# Patient Record
Sex: MALE | Race: WHITE | NOT HISPANIC OR LATINO | Employment: UNEMPLOYED | URBAN - METROPOLITAN AREA
[De-identification: names, ages, dates, MRNs, and addresses within clinical notes are randomized per-mention and may not be internally consistent; named-entity substitution may affect disease eponyms.]

---
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
## 2021-02-03 ENCOUNTER — TRANSFERRED RECORDS (OUTPATIENT)
Dept: HEALTH INFORMATION MANAGEMENT | Facility: CLINIC | Age: 21
End: 2021-02-03

## 2021-05-17 ENCOUNTER — TRANSFERRED RECORDS (OUTPATIENT)
Dept: HEALTH INFORMATION MANAGEMENT | Facility: CLINIC | Age: 21
End: 2021-05-17

## 2021-07-02 ENCOUNTER — TRANSFERRED RECORDS (OUTPATIENT)
Dept: HEALTH INFORMATION MANAGEMENT | Facility: CLINIC | Age: 21
End: 2021-07-02

## 2021-07-02 LAB
CREATININE (EXTERNAL): 0.75 MG/DL (ref 0.72–1.25)
GLUCOSE (EXTERNAL): 83 MG/DL (ref 70–99)
POTASSIUM (EXTERNAL): 3.7 MMOL/L (ref 3.3–4.8)

## 2021-09-15 ENCOUNTER — TRANSFERRED RECORDS (OUTPATIENT)
Dept: HEALTH INFORMATION MANAGEMENT | Facility: CLINIC | Age: 21
End: 2021-09-15

## 2022-07-18 ENCOUNTER — TELEPHONE (OUTPATIENT)
Dept: CARDIOLOGY | Facility: CLINIC | Age: 22
End: 2022-07-18

## 2022-07-18 ENCOUNTER — OFFICE VISIT (OUTPATIENT)
Dept: FAMILY MEDICINE | Facility: CLINIC | Age: 22
End: 2022-07-18
Payer: MEDICAID

## 2022-07-18 VITALS
BODY MASS INDEX: 19.4 KG/M2 | DIASTOLIC BLOOD PRESSURE: 71 MMHG | WEIGHT: 120.7 LBS | HEIGHT: 66 IN | HEART RATE: 96 BPM | TEMPERATURE: 98.1 F | SYSTOLIC BLOOD PRESSURE: 134 MMHG

## 2022-07-18 DIAGNOSIS — Z87.74 ADULT PATIENT WITH HISTORY OF CONGENITAL HEART DISEASE: Primary | ICD-10-CM

## 2022-07-18 DIAGNOSIS — R51.9 CHRONIC NONINTRACTABLE HEADACHE, UNSPECIFIED HEADACHE TYPE: ICD-10-CM

## 2022-07-18 DIAGNOSIS — G89.29 CHRONIC NONINTRACTABLE HEADACHE, UNSPECIFIED HEADACHE TYPE: ICD-10-CM

## 2022-07-18 PROCEDURE — 99204 OFFICE O/P NEW MOD 45 MIN: CPT | Performed by: FAMILY MEDICINE

## 2022-07-18 RX ORDER — KETOROLAC TROMETHAMINE 10 MG/1
10 TABLET, FILM COATED ORAL EVERY 6 HOURS PRN
COMMUNITY
End: 2022-07-18

## 2022-07-18 RX ORDER — ASPIRIN 81 MG/1
81 TABLET, CHEWABLE ORAL DAILY
COMMUNITY

## 2022-07-18 RX ORDER — KETOROLAC TROMETHAMINE 10 MG/1
10 TABLET, FILM COATED ORAL EVERY 6 HOURS PRN
Qty: 30 TABLET | Refills: 3 | Status: SHIPPED | OUTPATIENT
Start: 2022-07-18 | End: 2023-12-07

## 2022-07-18 NOTE — TELEPHONE ENCOUNTER
New Congenital/CV Genetics Patient Intake    Referred by: Dr. Ding  1.  Patient's cardiac history:  when pt was born, they said there was some type of valve that tightened up so blood wasn't getting to where it was needed so he had surgeries at 10 days old. Back in oct 2018 pt had second heart surgery  2.  Previous cardiac procedures (heart catherizations, surgeries): 2 open heart surgeries, 1st one was in [facility], and the other ones in [facility].  3.  Patient's previous cardiologist and when last visit was: September of last year, [facility],   4.  Recent testing (Echo, MRI, CT, Stress tests): Last September  5.  Any present concerns: None  6.  Closet location for patient to be seen ([facility]): [facility]  7.  Any recent testing at the [facility]: none  8.  Patient's E mail or Fax number to send ALCIDES: [email or web address]  9.  Update chart with patient's PCP: None  10. Any genetic testing done within the family: None  11. Reason for referral: Establish care     RECORDS REQUESTED FROM:         Clinic name Comments Records Status Imaging Status                                                          RECORDS STATUS: In process

## 2022-07-18 NOTE — TELEPHONE ENCOUNTER
[facility] Call Center    Phone Message    May a detailed message be left on voicemail: yes     Reason for Call: Appointment Intake    Referring Provider Name: Julio Ding MD    Diagnosis and/or Symptoms: Congenital Heart Disease

## 2022-07-18 NOTE — PROGRESS NOTES
Assessment & Plan     Adult patient with history of congenital heart disease  Referral has been placed for follow-up of his congenital heart disease.  Currently has no significant activity restrictions.  SBE prophylaxis advised prior to dental appointments.  - Adult Cardiology Micaelaal Duane Referral; Future    Chronic nonintractable headache, unspecified headache type  Satisfied with current control, continue with ketorolac as abortive therapy  - ketorolac (TORADOL) 10 MG tablet; Take 1 tablet (10 mg) by mouth every 6 hours as needed for moderate pain (headaches)                 No follow-ups on file.    Julio Ding MD  [facility]    Rosalee Burgos is a 22 year old accompanied by his father, presenting for the following health issues:  Establish Care (Establish [facility], recently moved from [place].  Would like to get set up w/ cardiologist)      History of Present Illness       Reason for visit:  Establish care    He eats 0-1 servings of fruits and vegetables daily.He consumes 1 sweetened beverage(s) daily.He exercises with enough effort to increase his heart rate 30 to 60 minutes per day.  He exercises with enough effort to increase his heart rate 3 or less days per week.   He is taking medications regularly.    Patient is here to establish [facility], recently moved to area from [place].  He would like to get set up with a cardiologist, has had 2 open heart surgeries in the past.     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  He had surgery as an infant and follow-up surgery as a teenager.  He is currently doing well.  He and his family recently moved from [facility].  His father is a [employer]  at the local [employer].  His last visit with cardiology was in September of last year.  He is due for follow-up in August or September of this year.  He would like to establish cardiology care locally.  He also  "has intermittent headaches.  He describes them as throbbing and left-sided.  He does not recall having a diagnosis of migraine.  He treats the headache with ketorolac abortively.  He reports about 3 headaches per month.  He is currently satisfied with headache control.    Records from [facility] have been reviewed.        Review of Systems   Constitutional, HEENT, cardiovascular, pulmonary, gi and gu systems are negative, except as otherwise noted.      Objective    /71 (BP Location: Left arm, Cuff Size: Adult Regular)   Pulse 96   Temp 98.1  F (36.7  C) (Tympanic)   Ht 1.664 m (5' 5.5\")   Wt 54.7 kg (120 lb 11.2 oz)   BMI 19.78 kg/m    Body mass index is 19.78 kg/m .  Physical Exam   GENERAL: healthy, alert and no distress  NECK: no adenopathy, no asymmetry, masses, or scars and thyroid normal to palpation  RESP: lungs clear to auscultation - no rales, rhonchi or wheezes  CV: regular rate and rhythm, normal S1 S2, no S3 or S4, no murmur, click or rub, no peripheral edema and peripheral pulses strong  ABDOMEN: soft, nontender, no hepatosplenomegaly, no masses and bowel sounds normal  MS: no gross musculoskeletal defects noted, no edema  PSYCH: mentation appears normal, affect normal/bright                    .  ..  "

## 2022-07-25 ENCOUNTER — CARE COORDINATION (OUTPATIENT)
Dept: CARDIOLOGY | Facility: CLINIC | Age: 22
End: 2022-07-25

## 2022-07-25 DIAGNOSIS — Q24.9 ADULT CONGENITAL HEART DISEASE: ICD-10-CM

## 2022-07-25 DIAGNOSIS — Q20.0 TRUNCUS ARTERIOSUS: Primary | ICD-10-CM

## 2022-07-25 NOTE — PROGRESS NOTES
Date: 7/25/2022    Time of Call: 5:18 PM     Diagnosis:  Truncus arteriousus     [ TORB ] Ordering provider: Dr Micah Varner  Order: establish care with [facility] clinic with echo and labs prior     Order received by: Nerissa Platt    Follow-up/additional notes:   Pt to establish care from [facility]- last seen 9/2021 by Dr Vazquez    Aubrey was last seen while in the hospital. 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 He was admitted last month for pulmonary valve homograft placement (25 mm), RPA stent removal with RPA reconstruction. He was discharged on 10/18/18 and represented with a fever and large left-sided pleural effusion. He was last seen in January 2020. He then underwent cardiac cath with stent placement in the coarctation.    Aubrey was seen in the Pediatric Cardiology Clinic on 09/15/21. According to his father, who accompanied him to the visit, he has been doing well with no further chest pain. He has no other symptoms related to his heart.      In summary, Aubrey is followed after RV-PA conduit replacement and PA augmentation. He is doing well at this time.     Risk: There is a risk of SBE, which may be life threatening.  Cardiac catheterization or surgery may be needed.      Patient counseling: Time was spent providing education regarding the patient's specific cardiovascular condition, today's assessment, and the long term outlook. The family verbalizes understanding of the plan and condition. All current questions were answered.     SBE prophylaxis: According to the 2007 AHA guidelines, antibiotic prophylaxis is recommended for dental work or other procedures. Ongoing routine dental care and good oral hygiene is recommended to lower the risk of SBE, as well.      Plan:  Medications/Treatments:   Continue ASA 81mg po q day    Follow-up testing: Echo, ECG    Return to Clinic: 12  months

## 2022-09-19 ENCOUNTER — DOCUMENTATION ONLY (OUTPATIENT)
Dept: CARDIOLOGY | Facility: CLINIC | Age: 22
End: 2022-09-19

## 2022-11-03 ENCOUNTER — HOSPITAL ENCOUNTER (OUTPATIENT)
Dept: CARDIOLOGY | Facility: CLINIC | Age: 22
Discharge: HOME | End: 2022-11-03
Payer: MEDICAID

## 2022-11-03 ENCOUNTER — OFFICE VISIT (OUTPATIENT)
Dept: PEDIATRIC CARDIOLOGY | Facility: CLINIC | Age: 22
End: 2022-11-03
Attending: FAMILY MEDICINE
Payer: MEDICAID

## 2022-11-03 ENCOUNTER — LAB (OUTPATIENT)
Dept: LAB | Facility: CLINIC | Age: 22
End: 2022-11-03
Payer: MEDICAID

## 2022-11-03 VITALS
HEIGHT: 65 IN | TEMPERATURE: 98.3 F | WEIGHT: 113.7 LBS | HEART RATE: 76 BPM | SYSTOLIC BLOOD PRESSURE: 154 MMHG | DIASTOLIC BLOOD PRESSURE: 86 MMHG | BODY MASS INDEX: 18.94 KG/M2 | RESPIRATION RATE: 16 BRPM | OXYGEN SATURATION: 100 %

## 2022-11-03 DIAGNOSIS — Q20.0 TRUNCUS ARTERIOSUS: ICD-10-CM

## 2022-11-03 DIAGNOSIS — Q24.9 ADULT CONGENITAL HEART DISEASE: ICD-10-CM

## 2022-11-03 DIAGNOSIS — Z87.74 ADULT PATIENT WITH HISTORY OF CONGENITAL HEART DISEASE: ICD-10-CM

## 2022-11-03 LAB
ALBUMIN SERPL BCG-MCNC: 4.5 G/DL (ref 3.5–5.2)
ALP SERPL-CCNC: 44 U/L (ref 40–129)
ALT SERPL W P-5'-P-CCNC: 22 U/L (ref 10–50)
ANION GAP SERPL CALCULATED.3IONS-SCNC: 9 MMOL/L (ref 7–15)
AST SERPL W P-5'-P-CCNC: 23 U/L (ref 10–50)
BASOPHILS # BLD AUTO: 0 10E3/UL (ref 0–0.2)
BASOPHILS NFR BLD AUTO: 1 %
BILIRUB SERPL-MCNC: 0.6 MG/DL
BUN SERPL-MCNC: 13.8 MG/DL (ref 6–20)
CALCIUM SERPL-MCNC: 9.4 MG/DL (ref 8.6–10)
CHLORIDE SERPL-SCNC: 103 MMOL/L (ref 98–107)
CHOLEST SERPL-MCNC: 145 MG/DL
CREAT SERPL-MCNC: 0.89 MG/DL (ref 0.67–1.17)
DEPRECATED HCO3 PLAS-SCNC: 27 MMOL/L (ref 22–29)
EOSINOPHIL # BLD AUTO: 0.1 10E3/UL (ref 0–0.7)
EOSINOPHIL NFR BLD AUTO: 3 %
ERYTHROCYTE [DISTWIDTH] IN BLOOD BY AUTOMATED COUNT: 12.5 % (ref 10–15)
GFR SERPL CREATININE-BSD FRML MDRD: >90 ML/MIN/1.73M2
GLUCOSE SERPL-MCNC: 97 MG/DL (ref 70–99)
HCT VFR BLD AUTO: 45.7 % (ref 40–53)
HDLC SERPL-MCNC: 67 MG/DL
HGB BLD-MCNC: 14.4 G/DL (ref 13.3–17.7)
IMM GRANULOCYTES # BLD: 0 10E3/UL
IMM GRANULOCYTES NFR BLD: 0 %
LDLC SERPL CALC-MCNC: 70 MG/DL
LYMPHOCYTES # BLD AUTO: 1.6 10E3/UL (ref 0.8–5.3)
LYMPHOCYTES NFR BLD AUTO: 36 %
MCH RBC QN AUTO: 28.6 PG (ref 26.5–33)
MCHC RBC AUTO-ENTMCNC: 31.5 G/DL (ref 31.5–36.5)
MCV RBC AUTO: 91 FL (ref 78–100)
MONOCYTES # BLD AUTO: 0.5 10E3/UL (ref 0–1.3)
MONOCYTES NFR BLD AUTO: 11 %
NEUTROPHILS # BLD AUTO: 2.2 10E3/UL (ref 1.6–8.3)
NEUTROPHILS NFR BLD AUTO: 49 %
NONHDLC SERPL-MCNC: 78 MG/DL
NRBC # BLD AUTO: 0 10E3/UL
NRBC BLD AUTO-RTO: 0 /100
PLATELET # BLD AUTO: 196 10E3/UL (ref 150–450)
POTASSIUM SERPL-SCNC: 4 MMOL/L (ref 3.4–5.3)
PROT SERPL-MCNC: 7.4 G/DL (ref 6.4–8.3)
RBC # BLD AUTO: 5.04 10E6/UL (ref 4.4–5.9)
SODIUM SERPL-SCNC: 139 MMOL/L (ref 136–145)
TRIGL SERPL-MCNC: 40 MG/DL
TSH SERPL DL<=0.005 MIU/L-ACNC: 2.69 UIU/ML (ref 0.3–4.2)
WBC # BLD AUTO: 4.5 10E3/UL (ref 4–11)

## 2022-11-03 PROCEDURE — 99205 OFFICE O/P NEW HI 60 MIN: CPT | Mod: 25

## 2022-11-03 PROCEDURE — G0463 HOSPITAL OUTPT CLINIC VISIT: HCPCS

## 2022-11-03 PROCEDURE — 93010 ELECTROCARDIOGRAM REPORT: CPT

## 2022-11-03 PROCEDURE — 93325 DOPPLER ECHO COLOR FLOW MAPG: CPT | Mod: 26 | Performed by: PEDIATRICS

## 2022-11-03 PROCEDURE — 84155 ASSAY OF PROTEIN SERUM: CPT

## 2022-11-03 PROCEDURE — 93303 ECHO TRANSTHORACIC: CPT | Mod: 26 | Performed by: PEDIATRICS

## 2022-11-03 PROCEDURE — 85025 COMPLETE CBC W/AUTO DIFF WBC: CPT

## 2022-11-03 PROCEDURE — 84443 ASSAY THYROID STIM HORMONE: CPT

## 2022-11-03 PROCEDURE — 36415 COLL VENOUS BLD VENIPUNCTURE: CPT

## 2022-11-03 PROCEDURE — 93320 DOPPLER ECHO COMPLETE: CPT | Mod: 26 | Performed by: PEDIATRICS

## 2022-11-03 PROCEDURE — 80061 LIPID PANEL: CPT

## 2022-11-03 PROCEDURE — 93325 DOPPLER ECHO COLOR FLOW MAPG: CPT

## 2022-11-03 RX ORDER — AMOXICILLIN 500 MG/1
CAPSULE ORAL
Qty: 20 CAPSULE | Refills: 3 | Status: SHIPPED | OUTPATIENT
Start: 2022-11-03 | End: 2023-12-07

## 2022-11-03 ASSESSMENT — PAIN SCALES - GENERAL: PAINLEVEL: NO PAIN (0)

## 2022-11-03 NOTE — NURSING NOTE
Cardiac Monitors: Patient was instructed regarding the indication, function, care and prompt return of a holter  monitor. The monitor was placed on the patient with instructions regarding care of the skin electrodes and monitor, as well as documentation in the patient diary. Patient demonstrated understanding of this information and agreed to call with further questions or concerns.    Cardiac Testing: Patient given instructions regarding  echocardiogram . Discussed purpose, preparation, procedure and when to expect results reported back to the patient. Patient demonstrated understanding of this information and agreed to call with further questions or concerns.    Med Reconcile: Reviewed and verified all current medications with the patient. The updated medication list was printed and given to the patient.    Return Appointment: Patient given instructions regarding scheduling next clinic visit. Patient demonstrated understanding of this information and agreed to call with further questions or concerns.    Patient stated he understood all health information given and agreed to call with further questions or concerns.
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
## 2022-11-03 NOTE — PATIENT INSTRUCTIONS
You were seen today in the Adult Congenital and Cardiovascular Genetics Clinic at the [facility].    Cardiology Providers you saw during your visit:  Micah Varner MD    Diagnosis:  Truncus arteriosus    Results:  Micah Varner MD reviewed the results of your EKG , echo and lab testing today in clinic.    Your recommendations from today's visit:    We will send out a 1 week zio to place when convenient  We will call you to schedule a head MRA  We have sent an amoxacillin prescription to use before the dentist    General cardiology recommendations:    Continue to eat a heart healthy, low salt diet.  Continue to get 20-30 minutes of aerobic activity, 4-5 days per week.  Examples of aerobic activity include walking, running, swimming, cycling, etc.  Continue to observe good oral hygiene, with regular dental visits.    SBE prophylaxis:   Yes__x__  No____    Lifelong Bacterial Endocarditis Prophylaxis:  YES____  NO____    If YES is checked, follow the recommendations outlined below:  Take antibiotic(s) prior to recommended dental procedures and procedures on the respiratory tract or with infected skin, muscle or bones. SBE prophylaxis is not needed for routine GI and  procedures (ie. Colonoscopy or vaginal delivery)  Observe good oral hygiene daily, as advised by your dentist. Get regular professional dental care.  Keep cuts clean.  Infections should be treated promptly.  Symptoms of Infective Endocarditis could include: fever lasting more than 4-5 days or a recurrent fever that initially resolves but returns within 1-2 days)      Exercise restrictions:   Yes__X__  No____         If yes, list restrictions:  Must be allowed to rest if fatigued or SOB      Work restrictions:  Yes____  No_X___         If yes, list restrictions:    FASTING CHOLESTEROL was checked in the last 5 years YES__x_  NO___ (2022)    Follow-up:  Follow up with Dr Varner in 1 year with echo and ekg prior    If you have questions or concerns  please contact us at:    HARRY GravesN, RN    Netta Harris (Scheduling)  Nurse Care Coordinator     Clinic   Adult Congenital and CV Genetics   Adult Congenital and CV Genetic  [facility]  (P) [phone]     (P) [phone]         (F) [phone]        For after hours urgent needs, call [phone] and ask to speak to the Adult Congenital Physician on call.  Mention Job Code 0401.    For emergencies call 911.    [facility]   Clinics and Surgery Center  Mail Code [id number]  [address] [place] [address]

## 2023-01-17 ENCOUNTER — ANCILLARY PROCEDURE (OUTPATIENT)
Dept: CARDIOLOGY | Facility: CLINIC | Age: 23
End: 2023-01-17
Payer: MEDICAID

## 2023-01-17 ENCOUNTER — ANCILLARY PROCEDURE (OUTPATIENT)
Dept: MRI IMAGING | Facility: CLINIC | Age: 23
End: 2023-01-17
Payer: MEDICAID

## 2023-01-17 DIAGNOSIS — Q20.0 TRUNCUS ARTERIOSUS: ICD-10-CM

## 2023-01-17 DIAGNOSIS — Z87.74 ADULT PATIENT WITH HISTORY OF CONGENITAL HEART DISEASE: ICD-10-CM

## 2023-01-17 DIAGNOSIS — Q24.9 ADULT CONGENITAL HEART DISEASE: ICD-10-CM

## 2023-01-17 PROCEDURE — 93244 EXT ECG>48HR<7D REV&INTERPJ: CPT

## 2023-01-17 PROCEDURE — 70544 MR ANGIOGRAPHY HEAD W/O DYE: CPT | Mod: GC | Performed by: RADIOLOGY

## 2023-01-17 PROCEDURE — 93242 EXT ECG>48HR<7D RECORDING: CPT

## 2023-01-17 NOTE — PROGRESS NOTES
Per Dr. Varner, patient to have Zio monitor placed.  Diagnosis: Truncus Arteriosus  Monitor placed: Yes  Patient Instructed: Yes  Patient verbalized understanding: Yes  Holter # [id number]

## 2023-11-09 ENCOUNTER — HOSPITAL ENCOUNTER (OUTPATIENT)
Dept: CARDIOLOGY | Facility: CLINIC | Age: 23
Discharge: HOME | End: 2023-11-09
Payer: MEDICAID

## 2023-11-09 DIAGNOSIS — Q20.0 TRUNCUS ARTERIOSUS: ICD-10-CM

## 2023-11-09 DIAGNOSIS — Z87.74 ADULT PATIENT WITH HISTORY OF CONGENITAL HEART DISEASE: ICD-10-CM

## 2023-11-09 DIAGNOSIS — Q24.9 ADULT CONGENITAL HEART DISEASE: ICD-10-CM

## 2023-11-09 PROCEDURE — 93320 DOPPLER ECHO COMPLETE: CPT | Mod: 26 | Performed by: PEDIATRICS

## 2023-11-09 PROCEDURE — 93325 DOPPLER ECHO COLOR FLOW MAPG: CPT

## 2023-11-09 PROCEDURE — 93303 ECHO TRANSTHORACIC: CPT | Mod: 26 | Performed by: PEDIATRICS

## 2023-11-09 PROCEDURE — 93303 ECHO TRANSTHORACIC: CPT

## 2023-11-09 PROCEDURE — 93325 DOPPLER ECHO COLOR FLOW MAPG: CPT | Mod: 26 | Performed by: PEDIATRICS

## 2023-12-07 ENCOUNTER — OFFICE VISIT (OUTPATIENT)
Dept: PEDIATRIC CARDIOLOGY | Facility: CLINIC | Age: 23
End: 2023-12-07
Payer: MEDICAID

## 2023-12-07 VITALS
HEIGHT: 65 IN | SYSTOLIC BLOOD PRESSURE: 131 MMHG | OXYGEN SATURATION: 100 % | DIASTOLIC BLOOD PRESSURE: 80 MMHG | HEART RATE: 82 BPM | WEIGHT: 113.98 LBS | BODY MASS INDEX: 18.99 KG/M2

## 2023-12-07 DIAGNOSIS — Z87.74 ADULT PATIENT WITH HISTORY OF CONGENITAL HEART DISEASE: ICD-10-CM

## 2023-12-07 DIAGNOSIS — Q24.9 ADULT CONGENITAL HEART DISEASE: ICD-10-CM

## 2023-12-07 DIAGNOSIS — Q20.0 TRUNCUS ARTERIOSUS: ICD-10-CM

## 2023-12-07 PROCEDURE — 99214 OFFICE O/P EST MOD 30 MIN: CPT

## 2023-12-07 PROCEDURE — G0463 HOSPITAL OUTPT CLINIC VISIT: HCPCS

## 2023-12-07 PROCEDURE — 93010 ELECTROCARDIOGRAM REPORT: CPT

## 2023-12-07 PROCEDURE — 93005 ELECTROCARDIOGRAM TRACING: CPT

## 2023-12-07 RX ORDER — AMOXICILLIN 500 MG/1
CAPSULE ORAL
Qty: 20 CAPSULE | Refills: 3 | Status: SHIPPED | OUTPATIENT
Start: 2023-12-07

## 2023-12-07 ASSESSMENT — PATIENT HEALTH QUESTIONNAIRE - PHQ9: SUM OF ALL RESPONSES TO PHQ QUESTIONS 1-9: 7
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
## 2023-12-07 NOTE — NURSING NOTE
"Informant-    Aubrey is accompanied by father    Reason for Visit-  Truncus aterious     Vitals signs-  /80   Pulse 82   Ht 1.656 m (5' 5.2\")   Wt 51.7 kg (113 lb 15.7 oz)   SpO2 100%   BMI 18.85 kg/m      There are concerns about the child's exposure to violence in the home: No    Need Flu Shot: No    Need MyChart: No    Does the patient need any medication refills today? No    Face to Face time: 5 Minutes  Ruthie Clay MA      "

## 2023-12-07 NOTE — PATIENT INSTRUCTIONS
You were seen today in the Adult Congenital and Cardiovascular Genetics Clinic at the [facility].    Cardiology Providers you saw during your visit:  YURI Villalba MD and Micah Varner MD    Diagnosis:  Truncus arteriosus    Results:  Micah Varner MD reviewed the results of your EKG and echo testing today in clinic.    Recommendations for you:    Take your amoxicillin prior to dental appointments      General Cardiac Recommendations:  Continue to eat a heart healthy, low salt diet.  Continue to get 20-30 minutes of aerobic activity, 4-5 days per week.  Examples of aerobic activity include walking, running, swimming, cycling, etc.  Continue to observe good oral hygiene, with regular dental visits.      SBE prophylaxis:   Yes__X__  No____    If YES is checked, follow the recommendations outlined below:  Take antibiotic(s) prior to recommended dental procedures and procedures on the respiratory tract or with infected skin, muscle or bones. SBE prophylaxis is not needed for routine GI and  procedures (ie. Colonoscopy or vaginal delivery)  Observe good oral hygiene daily, as advised by your dentist. Get regular professional dental care.  Keep cuts clean.  Infections should be treated promptly.  Symptoms of Infective Endocarditis could include: fever lasting more than 4-5 days or a recurrent fever that initially resolves but returns within 1-2 days)      Exercise restrictions:   Yes__X__  No____         If yes, list restrictions:  Must be allowed to rest if fatigued or SOB      FASTING CHOLESTEROL was checked in the last 5 years YES_X__  NO___ (2022)      Follow-up:  Follow up with Dr Varner in 1 year with an echo and EKG     If you have questions or concerns please contact us at:    Nerissa Platt, RN, BSN   Netta Harris (Scheduling)  Nurse Care Coordinator     Clinic   Adult Congenital and CV Genetics   Adult Congenital and CV Genetic  [facility]   [facility]  [place] [facility]  (P) [phone]     (P) [phone]            (F) [phone]        For after hours urgent needs, call [phone] and ask to speak to the Adult Congenital Physician on call.  Mention Job Code 0401.    For emergencies call 911.    [facility]  [facility]  Mail Code [id number]  [address] [place] [id number]

## 2025-02-05 ENCOUNTER — ANCILLARY PROCEDURE (OUTPATIENT)
Dept: CARDIOLOGY | Facility: CLINIC | Age: 25
End: 2025-02-05
Payer: MEDICAID

## 2025-02-05 DIAGNOSIS — Q20.0 TRUNCUS ARTERIOSUS: ICD-10-CM

## 2025-02-05 DIAGNOSIS — Z87.74 ADULT PATIENT WITH HISTORY OF CONGENITAL HEART DISEASE: ICD-10-CM

## 2025-02-05 DIAGNOSIS — Q24.9 ADULT CONGENITAL HEART DISEASE: ICD-10-CM

## 2025-02-05 PROCEDURE — 93303 ECHO TRANSTHORACIC: CPT | Performed by: STUDENT IN AN ORGANIZED HEALTH CARE EDUCATION/TRAINING PROGRAM

## 2025-02-05 PROCEDURE — 93320 DOPPLER ECHO COMPLETE: CPT | Performed by: STUDENT IN AN ORGANIZED HEALTH CARE EDUCATION/TRAINING PROGRAM

## 2025-02-05 PROCEDURE — 93325 DOPPLER ECHO COLOR FLOW MAPG: CPT | Performed by: STUDENT IN AN ORGANIZED HEALTH CARE EDUCATION/TRAINING PROGRAM

## 2025-02-11 ENCOUNTER — OFFICE VISIT (OUTPATIENT)
Dept: CARDIOLOGY | Facility: CLINIC | Age: 25
End: 2025-02-11
Payer: MEDICAID

## 2025-02-11 VITALS
SYSTOLIC BLOOD PRESSURE: 158 MMHG | DIASTOLIC BLOOD PRESSURE: 90 MMHG | HEART RATE: 79 BPM | OXYGEN SATURATION: 99 % | BODY MASS INDEX: 19.25 KG/M2 | WEIGHT: 116.4 LBS

## 2025-02-11 DIAGNOSIS — Z51.81 ENCOUNTER FOR MONITORING SBE (SUBACUTE BACTERIAL ENDOCARDITIS) PROPHYLAXIS: ICD-10-CM

## 2025-02-11 DIAGNOSIS — Q25.1 COARCTATION OF THE AORTA, COMPLEX: Primary | ICD-10-CM

## 2025-02-11 DIAGNOSIS — Z87.74 ADULT PATIENT WITH HISTORY OF CONGENITAL HEART DISEASE: ICD-10-CM

## 2025-02-11 DIAGNOSIS — Q20.0 TRUNCUS ARTERIOSUS: ICD-10-CM

## 2025-02-11 DIAGNOSIS — Q24.9 ADULT CONGENITAL HEART DISEASE: ICD-10-CM

## 2025-02-11 LAB
ATRIAL RATE - MUSE: 77 BPM
DIASTOLIC BLOOD PRESSURE - MUSE: NORMAL MMHG
INTERPRETATION ECG - MUSE: NORMAL
P AXIS - MUSE: 9 DEGREES
PR INTERVAL - MUSE: 130 MS
QRS DURATION - MUSE: 124 MS
QT - MUSE: 422 MS
QTC - MUSE: 477 MS
R AXIS - MUSE: 122 DEGREES
SYSTOLIC BLOOD PRESSURE - MUSE: NORMAL MMHG
T AXIS - MUSE: 68 DEGREES
VENTRICULAR RATE- MUSE: 77 BPM

## 2025-02-11 PROCEDURE — 93005 ELECTROCARDIOGRAM TRACING: CPT

## 2025-02-11 PROCEDURE — 99214 OFFICE O/P EST MOD 30 MIN: CPT

## 2025-02-11 PROCEDURE — G0463 HOSPITAL OUTPT CLINIC VISIT: HCPCS

## 2025-02-11 RX ORDER — ASPIRIN 81 MG/1
81 TABLET, CHEWABLE ORAL DAILY
Qty: 90 TABLET | Refills: 3 | Status: SHIPPED | OUTPATIENT
Start: 2025-02-11

## 2025-02-11 RX ORDER — AMOXICILLIN 500 MG/1
CAPSULE ORAL
Qty: 20 CAPSULE | Refills: 3 | Status: SHIPPED | OUTPATIENT
Start: 2025-02-11

## 2025-02-11 ASSESSMENT — PAIN SCALES - GENERAL: PAINLEVEL_OUTOF10: NO PAIN (0)
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
## 2025-02-11 NOTE — NURSING NOTE
Cardiac Testing: Patient given instructions regarding  echocardiogram  and congenital CTA. Discussed purpose, preparation, procedure and when to expect results reported back to the patient. Patient demonstrated understanding of this information and agreed to call with further questions or concerns.    Return Appointment: Patient given instructions regarding scheduling next clinic visit. Patient demonstrated understanding of this information and agreed to call with further questions or concerns.    Patient stated he understood all health information given and agreed to call with further questions or concerns.

## 2025-02-11 NOTE — NURSING NOTE
Chief Complaint   Patient presents with    Follow Up     24 year old male with history of Truncus arteriousus presenting for evaluation.       Vitals were taken, medications reconciled and EKG performed.    SABA Ge    8:45 AM
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
## 2025-02-11 NOTE — PATIENT INSTRUCTIONS
"Thank you for visiting the Adult [facility] Cardi[facility]etics Clinic at the [facility].    Cardiology Providers you saw during your visit:  Micah Varner MD    Diagnosis:  Truncus, coarctation of the aorta    Results:  Micah Varner MD reviewed the results of your EKG and echocardiogram testing today in clinic.    If you have questions or concerns, please call us at [phone] or contact us through [employer].  ______________________________________________________________________________    Recommendations from your Cardiology Provider TODAY:    Complete congenital CTA- we will call you to schedule  Continue Aspirin 81 mg daily  Establish care with a dentist and take Amoxicillin prior to the dentist  Continue regular exercise      ____________________________________________________________________________________________________    Follow-up Plan:  Follow up tentatively for 1 year with an echocardiogram, unless aortic stent needs intervention after CTA is completed    ____________________________________________________________________________________________________    If you have questions or concerns, please call us at [phone] or contact us through [employer]. Our fax number is [phone]    Nerissa Platt RN RN, BSN   Elizabeth Gibson (Scheduling)  Nurse Care Coordinator     Clinic   Adult Congenital and CV Genetics              Adult Congenital and CV Genetics  [facility]              [facility]        For after hours urgent needs, call [phone] and ask to speak to the \"On-Call Cardiologist.\"    For emergencies call 911.      ____________________________________________________________________________________________________    Additional Important Information for Your Heart Health      General Cardiac Recommendations:  Continue to eat a heart healthy, low salt diet.  Continue to get 20-30 minutes of aerobic " activity, 4-5 days per week.  Examples of aerobic activity include walking, running, swimming, cycling, etc.  Continue to observe good oral hygiene, with regular dental visits.        SBE prophylaxis (antibiotics needed before dental appointments):   Yes__X__  No____    SBE prophylaxis applies to patients with certain heart conditions who are recommended to take antibiotics before dental appointments and other specific procedures. These antibiotics are to help prevent an infection of the heart (endocarditis) that certain patients are at higher risk of developing. The guidelines used come from the [employer] Association and are periodically updated.    If YES is checked, follow the recommendations outlined below:  Take antibiotic(s) prior to recommended dental procedures and procedures involving the respiratory tract or procedures involving infections of the skin, muscle or bones.   SBE prophylaxis is not needed for routine gastrointestinal and genitourinary procedures (ie. Colonoscopy or vaginal delivery)  Observe good oral hygiene daily, as advised by your dentist. Get regular professional dental care.  Keep cuts and other open injuries clean.  All infections should be treated as soon as possible.  Symptoms of Infective Endocarditis could include: fever lasting more than 4-5 days or a recurrent fever that initially resolves but returns within 1-2 days). Call us a [phone] if you are experiencing these symptoms.        FASTING CHOLESTEROL was checked in the last 5 years YES__X__  NO____ (2022)  If no, please follow up with your primary care physician. You should have a cholesterol screening every 5 years at minimum, and every year if taking a medication for your cholesterol levels.

## 2025-04-09 ENCOUNTER — HOSPITAL ENCOUNTER (OUTPATIENT)
Dept: CT IMAGING | Facility: CLINIC | Age: 25
Discharge: HOME | End: 2025-04-09
Payer: MEDICAID

## 2025-04-09 DIAGNOSIS — Q24.9 ADULT CONGENITAL HEART DISEASE: ICD-10-CM

## 2025-04-09 DIAGNOSIS — Q20.0 TRUNCUS ARTERIOSUS: ICD-10-CM

## 2025-04-09 DIAGNOSIS — Q25.1 COARCTATION OF THE AORTA, COMPLEX: ICD-10-CM

## 2025-04-09 DIAGNOSIS — Z87.74 ADULT PATIENT WITH HISTORY OF CONGENITAL HEART DISEASE: ICD-10-CM

## 2025-04-09 PROCEDURE — 75573 CT HRT C+ STRUX CGEN HRT DS: CPT

## 2025-04-09 PROCEDURE — 250N000011 HC RX IP 250 OP 636: Performed by: INTERNAL MEDICINE

## 2025-04-09 RX ORDER — IOPAMIDOL 755 MG/ML
110 INJECTION, SOLUTION INTRAVASCULAR ONCE
Status: COMPLETED | OUTPATIENT
Start: 2025-04-09 | End: 2025-04-09

## 2025-04-09 RX ADMIN — IOPAMIDOL 110 ML: 755 INJECTION, SOLUTION INTRAVENOUS at 12:13
